# Patient Record
Sex: MALE | Race: OTHER | HISPANIC OR LATINO | URBAN - METROPOLITAN AREA
[De-identification: names, ages, dates, MRNs, and addresses within clinical notes are randomized per-mention and may not be internally consistent; named-entity substitution may affect disease eponyms.]

---
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
## 2025-04-17 NOTE — CONSULT NOTE PEDS - SUBJECTIVE AND OBJECTIVE BOX
A (GUIDEWIRE VASC OD.014 IN L190 CM L3 CM HI-TORQUE  50) guidewire was removed. HPI: 13 year old male no significant pmh initially presented to ED with sore throat and po intolerance, was found to be rhino/enter positive and was discharged home. Pt then presented 4 days later on 6/28 with the same complaints and was admitted for dehydration. Patient reports that throat pain has improved some since admission however he still has pain with swallowing. He is able to drink some liquids. He denies that his pain is worse on one side vs the other. Denies otalgia. No difficulty breathing.     Febrile Tmax 101.3  NAD, awake, well appearing   Speaking in full sentences   No drooling   No stridor   NC: clear   OC/OP: 3+ tonsils with exudates L slightly larger than R, uvula midline, soft palate symmetric   No trismus   Neck soft, no palpable LAD  Full neck ROM - pt endorses pain when turning to the left     Entero/rhino positive   Monospot negative x 2   EBV titers c/w prior infection   Throat cx neg for group b strep x 2